# Patient Record
Sex: FEMALE | Race: BLACK OR AFRICAN AMERICAN
[De-identification: names, ages, dates, MRNs, and addresses within clinical notes are randomized per-mention and may not be internally consistent; named-entity substitution may affect disease eponyms.]

---

## 2019-01-20 NOTE — HPEPDOC
Obstetrical History & Physical


General


Date of Admission


2019 at 15:35





History of Present Illness





39 yo  at 38+4 weeks by LMP of [id number] c/w 1st trimester US on  presented to L&D in active labor.  She denies any leakage of fluid or 

bleeding.  She endorses fetal movement.  Pregnancy is complicated by AMA, and 

she had low risk cff DNA testing.


Chief Complaint:  Contractions, term


Information Provided By:  Patient


Age:  38


:  4


Term:  2


Pre-term:  0


Abortions:  1 (Ectopic pregnancy requiring left salpingectomy)


Livin





Prenatal Care


Prenatal Care:  [facility]





Prenatal Dating


Final EDC:  2019


Final EDC for Daily Update:  2019


Final EDC by:  LMP


LMP:  2018


1st Trimester Date:  2018





Antepartum Course


Prenatal Diagnos(e)s





AMA ---> low risk cff DNA genetic screening





Past Medical History


Past Obstetrical History :  


   Past Obstetrical History:  Multigravida


   Type of Delivery:  Spontaneous Vaginal Del. ( X2 at term.  Pelvis proven 

to 8lbs 3oz.)


   Complications:  No


GYN History:  No pertinent history


Past Medical History


Medical History





Denies


Surgical History:  Appendectomy, Other (Left salpingectomy for ectopic 

pregnancy)





Family History


Significant Family History:  No pertinent family hx


Family History


Non contributory





Social History


Marital Status:  


Family situation:  Spouse/partner deployed


Psychosocial History:  No pertinent psych hx


* Smoker:  non-smoker


Alcohol:  Denies


Drugs:  denies





Prenatal Imunizations


Tdap status:  current


Influenza Status:  current





Allergies


Coded Allergies:  


     No Known Allergies (Unverified , 19)





Medications


Scheduled


Multivitamins/Prenatal (Prenatal 27-0.8 mg) 1 Tab Tab, 1 TAB PO DAILY





Physical Examination


Physical Examination





GENERAL: Alert and oriented times three.


ABDOMEN: Gravid and non-tender to touch.


FETUS: Is vertex (VTX) by sterile vaginal examination (SVE)


EXTREMITIES: No edema.





Laboratory Data


24H LABS


Laboratory Tests 2


19 15:50: Serology Scanned Report Hepatitis B Testing


Urine Culture:  No Growth





Pertinent Laboratoy Data


Blood Type:  B+


RBC Antibody Screen:  Negative


HIV:  Negative


Hepatitis B:  Negative


Hepatitis C:  Unknown


Rapid Plasma Reagin:  Nonreactive


Rubella:  Immune


Varicella:  Immune


Chlamydia/Gonorrhea:  Negative


Group B Streptococcus:  Negative


Quad Screen Test:  Negative


Cystic Fibrosis:  Negative


Glucose Tolerance Test:  83





Anatomy Ultrasound


Placenta Location:  Posterior


Normal Anatomy:  Yes (Choroid plexus cyst, hypocoiled umbilical cord, and EIF)





 Steroid Therapy


 Steroid Therapy:  No





Vaginal Examination


Dilation:  8 cm


Effacement:  100%


Station:  -1, 0


Cervical Consistency:  Soft


Cervical Position:  Anterior


Fetal Presentation:  Cephalic presentation


Fetal Position:  Vertex (occiput)





Fetal Assessment


Fetal Heart Rate (FHR):  125


Variability:  Moderate


Accelerations:  Positive


Decelerations:  None





Tocometer


Contractions:  Yes


Frequency:  regular


Duration:  greater than 60 seconds


Strength:  palpated as moderate





Assessment/Plan


Assessment





39 yo  at 38+4 weeks presented to L&D in active labor.  Pregnancy is 

uncomplicated.





Plan





Admit to L&D for expectant management of labor.


Apply IV fluids.


GBS negative.


Patient may have epidural if desired.


Anticipate .








DO BETHANY Genao CHRISTOPHER J. DO         2019 16:04

## 2019-01-20 NOTE — DNPDOC
[facility] Delivery Note


Delivery Note


DATE OF DELIVERY: 2019





PREDELIVERY DIAGNOSIS: 38+4 weeks gestation and active labor 





POST DELIVERY DIAGNOSIS: Delivered.





PROCEDURE: Spontaneous vaginal delivery





OBSTETRICIAN: Dr. Freedman





ANESTHESIA: None.





ESTIMATED BLOOD LOSS: 300 mL.





FINDINGS: 7 pound 0 ounce (3180 grams) male infant, Apgar Score 9/9.





DELIVERY SUMMARY:  I was called to the room as Ms. Bailey felt a strong urge to 

push.  Exam confirmed fetus at +2 station.  AROM performed productive of clear 

fluid.  The bed was broken down and she was prepped for delivery.  With 

excellent pushing effort her infant delivered.  Presentation was DANIELITO with 

restitution to ROT.  The left anterior shoulder delivered with gentle guidance 

followed easily by the remainder of the body.  The infant was dried and 

stimulated on the field and a bulb suction was used.  The infant cried 

vigorously and was placed on the maternal abdomen.  I then clamped the 3 vessel 

umbilical cord and Ms. Bailey cut it.  Third staged was completed spontaneously 

and it was productive of an intact placenta.  Inspection of the placenta 

revealed an approximately 25% area concerning for abruption.  The uterine fundus

was firmed with massage and pitocin was administered IV bolus. Inspection of the

cervix, perineum, labia, and vagina revealed a small, midline first degree 

vaginal floor laceration.  This was repaired with 3-0 vicryl suture in the usual

fashion.  There was excellent approximation of tissue and hemostasis.  The fund

us was palpated again and was firm.  Sponge, instrument, and needle counts were 

correct X2.  Mother stable when I left the room.








DO BETHANY Genao CHRISTOPHER J. DO         2019 17:50

## 2019-01-21 NOTE — IPNPDOC
Text Note


Date of Service


The patient was seen on 19.





NOTE





Ms. Bailey is a 39 yo G4 now P3 who underwent an uncomplicated  in the evening

on 2019.  Today she reports feeling well.  She is ambulating, voiding, and 

tolerating a regular diet.  Her lochia is minimal and she has no pain.  She is 

breastfeeding.





Vitals - VSS, intermittent mild BP elevations, but nothing consistent or 

severely elevated.  Afebrile, non tachycardic


General - AAOX3, sitting up in bed, NAD


Abdomen - Fundus firm at U-2.  No fundal tenderness


Extremities - No edema





No new labs





Ms. Bailey is doing well this AM and is making an appropriate postpartum 

recovery.   is in [place] and she is currently undecided regarding 

additional contraception.  Continue to encourage ambulation and breastfeeding 

today.  Continue routine postpartum care.  Anticipate DC home tomorrow.





Oneyda Sims DO





VS,Fishbone, I+O


VS, Fishbone, I+O


Laboratory Tests


19 15:38








Red Blood Count 4.10, Mean Corpuscular Volume 87.3, Mean Corpuscular Hemoglobin 

28.3, Mean Corpuscular Hemoglobin Concent 32.4, Red Cell Distribution Width 12.4








Vital Signs








  Date Time  Temp Pulse Resp B/P (MAP) Pulse Ox O2 Delivery O2 Flow Rate FiO2


 


19 06:01 98.3 68 16 135/72 (93)    














I&O- Last 24 Hours up to 6 AM 


 


 19





 06:00


 


Intake Total 1500 ml


 


Output Total 1100 ml


 


Balance 400 ml

















ONEYDA SIMS DO         2019 07:20

## 2019-01-22 NOTE — IPNPDOC
Postpartum Progress Note


Date of Service:  2019


Postpartum Day#:  2


Postpartum Progress Note


PPD 2





SUBJECT: Keiry is a 39yo [id number] s/p uncomplicated  after presenting in 

active labor at 38+wk, doing well postpartum day # 2. She has been ambulating, 

voiding spontaneously without issue and tolerating regular diet. Breast feeding 

without issue. Reports lochia is like a normal period. No f/c/n/v/CP/SOB.





OBJECTIVE: 


VITAL SIGNS: Within normal limits, afebrile.


Alert and oriented times three.


Abdomen: Fundus firm at U-2. Soft, NTTP.





ASSESSMENT: Keiry is a 39yo [id number] s/p uncomplicated  after presenting 

in active labor at 38+wk, doing well postpartum day # 2. Vitals within normal 

limits, afebrile, hemodynamically stable with no evidence of infection.





PLAN:


1. Discharge to home today.


2. Tylenol and Motrin for pain.


3. Encourage breast feeding and ambulation.


4. Routine PP visit in 6 weeks in clinic.


5. Discussed return precautions at length.





Dr. Melodie Hoffman MD





VS, I&O, 24H, Fishbone


Vital Signs/I&O





Vital Signs








  Date Time  Temp Pulse Resp B/P (MAP) Pulse Ox O2 Delivery O2 Flow Rate FiO2


 


19 05:47 97.8 62 18 134/70 (91)    

















Melodie Hoffman MD           2019 06:42

## 2021-08-02 NOTE — IPNPDOC
Text Note


Date of Service


The patient was seen on 21.





NOTE











Item Value  Date Time


 


Urine Color YELLOW 21


 


Urine Appearance HAZY 21


 


Urine pH 6.0 UNITS 21


 


Urine Specific Gravity 1.013 21


 


Urine Protein NEGATIVE mg/dL 21


 


Urine Glucose (UA) NEGATIVE mg/dL 21


 


Urine Ketones NEGATIVE mg/dL 21


 


Urine Blood 1+ H 21


 


Urine Nitrite NEGATIVE 21


 


Urine Bilirubin NEGATIVE 21


 


Urine Urobilinogen 0.2 mg/dL 21


 


Urine Leukocyte Esterase NEGATIVE 21


 


Urine WBC (Auto) 0 /HPF 21


 


Urine RBC (Auto) 1 /HPF 21


 


Urine Hyaline Casts (Auto) 0 /LPF 21


 


Urine Bacteria (Auto) NEGATIVE 21


 


Urine Squamous Epithelial Cells 2 /HPF 21


 


Urine Mucus (Auto) SMALL 21 41 yo  AT 36.4 WEEKS BASED ON US AT 8.1 WEEKS  EDC 21 

HISTORY CONTRACTIONS 5 2-5 MINUTES WITH PELVIC PRESSURE. 


 PAST HISTORY 


                    39.4 WEEKS  MALE 7LBS 3 OZ


                   10/2016 38 WEEKS  MALE 8 LBS 3 OZ


                   2018 38 WEEKS MALE  7 LBS 


                   10/2016 ECTOPIC  8 WEEKS LSO 


 RISK FACTORS 


                   AMA 


                   CHTN 


  PLAN AND EXAMINATION 


               APPEARS DISTRESSED CONTRACTIONS 4-5 MINUTES NO SHOW NO BLEEDING  

PELVIC EXAMINATION 2-3 CM VERY POSTERIOR 50% EFFACED  -3 STATION NO BLOOD 

CATEGORY 1 STRIP. REPEAT 3 HOURS LATER STILL DISTRESSED CERVIX NO CHANGE 

CATEGORY 1 STRIP NO DECELERATIONS MODERATE VARIABILITY  BASELINE NORMAL URINE 

NEGATIVE WELL HYDRATED . 


               OPTIONS  GO HOME OR SEDATION TO REST . PATIENT OPTED FOR SEDATION

WILL RECHECK IN 3 HOURS





VS,Fishbone, I+O


VS, Fishbone, I+O





Vital Signs








  Date Time  Temp Pulse Resp B/P (MAP) Pulse Ox O2 Delivery O2 Flow Rate FiO2


 


21 23:32  77 18 143/80 (101)    


 


21 23:29 98.5       

















Kaleb Macias MD              Aug 2, 2021 02:29

## 2021-08-02 NOTE — DSES
DISCHARGE SUMMARY



DATE OF ADMISSION:  2021

DATE OF DISCHARGE:  2021



This lady is a 40-year-old,  5, para 3, admitted for what appeared

distressful contractions 4-5 minutes apart, no show, no bleeding. Pelvic

examination showed 2-3 cm very posterior, 50% effaced, minus 3 station, no

bloody show, category 1 strip, repeated 3 hours later and 6 hours later, no

evidence of change of cervix. Contractions did space out. She was given Stadol

and Phenergan for sleep, did have a good sleep and upon re-examination, there

is basically no change at 0530 hours in the morning. Her vital signs: Her blood

pressure is 143/80. Respirations are 18, pulse 77. Temperature is 98.5. Retake

of her blood pressure 128/76, respirations 18, pulse 68. Temperature is 98.l5.

Examination is unchanged. The patient was given Stadol and Phenergan to sleep,

did a good sleep. Upon waking up, she had typical fetal heart rate tracing

based on her Stadol and Phenergan although there were no decelerations.

Contractions had spaced out considerably. The patient was discharged

undelivered. She has a followup appointment in 48 hours at the office.

Precautions were given. All questions were answered, 20 minute discussion. The

patient was discharged undelivered.







[facility]

## 2021-08-09 NOTE — IPNPDOC
Text Note


Date of Service


The patient was seen on 21.





NOTE


S: 39yo  peterson [id number] @37+4, presents to labor and delivery triage with c/o

pelvic pressure and irregular mild contractions.  Denies bleeding or LOF, states

reassuring FM





O: VSS


RNST, fhr 130, +accel, -decel, occasional contractions noted


Cervical exam 4/80/-3, posterior, moderate








A: AMA


suspected condition not found





P:Pt educated on return precautions, signs of labor, comfort measures, safe use 

of OTC medication for comfort including tylenol and benadryl for sleep with 

expressed understanding of all instructions and reasons to return for care.





VS,Fishbone, I+O


VS, Fishbone, I+O





Vital Signs








  Date Time  Temp Pulse Resp B/P (MAP) Pulse Ox O2 Delivery O2 Flow Rate FiO2


 


21 13:57 98.8 88 20 132/86 (101)    

















ZACH LAMA CNM        Aug 9, 2021 15:06

## 2021-08-12 NOTE — HPEPDOC
Obstetrical History & Physical


General


Date of Admission


Aug 12, 2021 at 08:18





History of Present Illness


presenting for scheduled IOL for ARIE MOJICA @38 wks


Chief Complaint:  Induction of labor


Information Provided By:  Patient


Age:  40


:  5


Term:  3


Pre-term:  0


Abortions:  1


Living:  3





Prenatal Care


Prenatal Care:  [facility]





Prenatal Dating


Final EDC:  Aug 26, 2021


Final EDC for Daily Update:  Aug 26, 2021


Final EDC by:  LMP


LMP:  2020


EGA at Admission:  38





Antepartum Course


Prenatal Diagnos(e)s


ARIE MOJICA


Height (inches):  67


Pre-Pregnancy weight (lbs.):  206


Admission Weight (lbs.):  245


Change in Weight (lbs.):  39





Past Medical History


Past Obstetrical History #1:  


   Past Obstetrical History:  Multigravida (2015, 39+4, 7#3 male)


   Type of Delivery:  Spontaneous Vaginal Del.


   Sex of Infant:  Male


Past Obstetrical History #2:  


   Past Obstetrical History:  Multigravida (2016, 38, 8#3 male)


   Type of Delivery:  Spontaneous Vaginal Del.


   Sex of Infant:  Male


Past Obstetrical History #3:  


   Past Obstetrical History:  Multigravida (2018, 38, 7# male)


   Type of Delivery:  Spontaneous Vaginal Del.


   Sex of Infant:  Male


   Complications:  No


GYN History:  Ectopic pregnancy (8wk left salpingectomy)


Past Medical History


Medical History


CHTN, obesity


Surgical History:  Appendectomy, Other (left salpingectomy for ectopic pregnan

cy)





Family History


Significant Family History:  No pertinent family hx





Social History


Marital Status:  


Family situation:  Spouse/partner home


Psychosocial History:  No pertinent psych hx


* Smoker:  non-smoker


Alcohol:  Denies


Drugs:  denies





Abuse Violence Screening


Have you been hit/kicked/slapp:  No


Have you been sexually assault:  No





Prenatal Imunizations


Tdap status:  current


Influenza Status:  current





Allergies


Coded Allergies:  


     No Known Allergies (Unverified , 19)





Medications


Scheduled


Prenatal No.137/Iron/Folic Acd (Prenatal Vitamin Tablet) 1 Tab Tab, 1 TAB PO 

DAILY





Physical Examination


Physical Examination


GENERAL: Alert and oriented times three.


BREAST: .


ABDOMEN: Gravid and non-tender to touch.


FETUS: Is vertex (VTX) by sterile vaginal examination (SVE), fetus is vertex 

(VTX) by Leopold.


HEART RATE: Regular rate and rhythm.


LUNGS: Clear to auscultation (CTA).


EXTREMITIES: No edema. No clonus. Deep tendon reflexes (DTRs) + 2.





Vital Signs/I&O





Vital Signs








  Date Time  Temp Pulse Resp B/P (MAP) Pulse Ox O2 Delivery O2 Flow Rate FiO2


 


21 10:17  78 16 124/75 (91)    


 


21 08:34     99 Room Air  


 


21 08:33 98.2       











Pertinent Laboratoy Data


Blood Type:  B+


RBC Antibody Screen:  Negative


HIV:  Negative


Hepatitis B:  Negative


Rapid Plasma Reagin:  Nonreactive


Rubella:  Immune


Varicella:  Immune


Chlamydia/Gonorrhea:  Negative


Group B Streptococcus:  Negative


Cystic Fibrosis:  Negative





Anatomy Ultrasound


Ultrasound Date:  2021


Placenta Location:  Anterior


Normal Anatomy:  Yes


Placenta Previa:  No





Vaginal Examination


Dilation:  4 cm


Effacement:  80%


Station:  -2


Cervical Consistency:  Soft


Cervical Position:  Posterior


Fetal Presentation:  Cephalic presentation





Fetal Assessment


Fetal Heart Rate (FHR):  120


Variability:  Moderate


Accelerations:  Positive


Decelerations:  None





Tocometer


Contractions:  Yes


Frequency:  greater than 9 min/apart


Duration:  greater than 60 seconds


Strength:  palpated as mild, resting tone palp/soft


Multi-drug resistant Organism:  No history of MDRO





Assessment/Plan


Assessment


Keiry is a 40-year-old  (G)5 para (P)3-0-1-3 at 38+0 weeks by 8+1-week 

ultrasound. Presents to Labor and Delivery (L&D) for scheduled IOL.  Denies 

bleeding, LOF, states irregular cramping has continued and she has frequent 

fetal movement.





Plan


Admit and orient.


 and consent.


Diet: clear.


Group B Streptococcus (GBS) negative.


Labs and intravenous (IV) per unit protocol.


Counseled on Pitocin and induction of labor (IOL).


Lactated Ringers (LR): at 125 mL/hr, bolus 1000mL prior to epidural anesthesia.


Anticipate [normal spontaneous delivery ()].


C-S as appropriate.











ZACH LAMA CNM       Aug 12, 2021 11:18

## 2021-08-12 NOTE — DNPDOC
[facility] Delivery Note


Delivery Note


DATE OF DELIVERY: 2021





PREDELIVERY DIAGNOSIS: 38-0/7 weeks' gestation and labor. 





POST DELIVERY DIAGNOSIS: Delivered.





PROCEDURE: Spontaneous vaginal delivery.





OBSTETRICIAN: Suly Lama CNM





ANESTHESIA: epidural.





ESTIMATED BLOOD LOSS: 200 mL.





FINDINGS: 8 pound 15 ounce male infant, Apgar Score 9/9, bandalero cord times 1.





DELIVERY SUMMARY: Patient is a 40-year-old  5 now para 4-0-1-4 who was 

admitted to labor and delivery for IOL on 2021. After becoming comfortable 

from epidural, patient was examined at 6/80/-3, AROM for clear fluid.  Shortly 

after exam, Keiry called out with c/o pressure and urge to push, at which time 

she was found to be C/C/+2.  Brisk fetal decent was made with maternal pushing 

efforts to  in OA presentation with restitution to AFSHIN.  A cord was noted 

after delivery of the fetal head and found to be bandalero.  The right anterior 

shoulder delivered easily followed by the posterior shoulder and corpus.  The 

male infant was placed immediately skin to skin on the maternal abdomen where he

was dried and stimulated to cry.  Pitocin infusion was initiated per protocol.  

The cord was clamped x2 after pulsation ceased and cut by the patient.  The pl

acenta delivered spontaneously intact in Perez presentation with moderate 

bleeding.  The cervix and vagina were swept for a small amount of clots and the 

fundus massaged until firm.  Examination of the perineum revealed a small 

hemostatic perineal laceration which did not require repair.  Mother and baby 

entered the recovery phase in stable condition.











SULY LAMA CNM       Aug 12, 2021 16:32

## 2021-08-13 NOTE — IPN
PROGRESS NOTE



DATE:  2021



SUBJECTIVE: This patient is a 40-year-old  5, now para 4 admitted for

induction of labor at 38 weeks gestation. She has history of chronic

hypertension and AMA.



She had spontaneous vaginal delivery male infant 8 pounds 15 ounces, Apgars of

9 and 9 at 1 and 5 minutes respectively. The placenta was complete. Perineum

was intact.



OBJECTIVE: On her first postpartum day we discussed phlebitis, cystitis,

mastitis, endometritis, cellulitis, diet, exercise, pain management, perineal

and breast care. The rest of the examination is unremarkable. Normocephalic,

atraumatic. Neck with full range of motion. Pupils equal and reactive to light.

Distal pulses symmetric. No evidence of DVT, PE, or superficial phlebitis.

Chest clear bilaterally to the bases. No wheezing. No rhonchi. No CVA

tenderness. Abdomen soft. Four quadrant bowels sounds are noted. Uterus two

below, lochia is moderate. No rashes, lesions, pruritus. No arthralgia or

myalgias. No complaint of joint pain. No complaint of cough, wheeze, shortness

of breath, or dyspnea on exertion. No nausea, vomiting, diarrhea, or

constipation. No urgency or frequency. 



Her vital signs this morning are blood pressure 139/89, respirations 18, pulse

78, temperature 97.9.



Her admitting hemoglobin was 11.9, hematocrit 37.1, platelets 190.



ASSESSMENT: In summary, we have a term gestation, delivered a livebirth male

infant. We are awaiting clearance for circumcision by the pediatrician.



PLAN: The patient's expectations are to go home tomorrow. Medications to be

picked up at [place]. 20 minute discussion. All questions were answered.





cc: [facility]

## 2021-08-14 NOTE — IPNPDOC
Postpartum Progress Note


Date of Service:  Aug 14, 2021


Postpartum Day#:  2


Postpartum Progress Note





DELIVERY SUMMARY: Patient is a 40-year-old  5 now para 4-0-1-4 who was 

admitted to labor and delivery for IOL for CHTN on 2021 and had a vaginal 

delivery, 8 pound 15 ounce male infant, Apgar Score 9/9, bandalero cord times 1.

She has been ambulating, voiding spontaneously without issue and tolerating 

regular diet. Breast feeding without issue. Reports lochia is less than a normal

period]. Patient is ambulating well. [Reports some cramping with breastfeeding. 

Denies any pain. Voiding and stooling without difficulty].





OBJECTIVE: 


VITAL SIGNS: Within normal limits, afebrile.


Alert and oriented times three.


no increased wob


Heart rate: non-tachy


Abdomen: Fundus firm at U-2. Soft, NTTP.


[Minimal] lochia per pt





ASSESSMENT: Patient is a 40-year-old  5 now para 4-0-1-4 who was admitted

 to labor and delivery for IOL for CHTN on 2021 and had a vaginal delivery,

 8 pound 15 ounce male infant, Apgar Score 9/9, bandalero cord times 1. Vitals 

within normal limits, afebrile, hemodynamically stable with no evidence of 

infection.





PLAN:


1. Discharge to home today.


2. Tylenol and Motrin for pain.


3. Encourage breast feeding and ambulation.


4. Desires mirena IUD for contraception.


5. Routine PP visit in 72h/7d (blood pressure checks) and 6 weeks in clinic.


6. Discussed return precautions at length to include pelvic rest and signs and 

symptoms of pre-eclampsia.





VS, I&O, 24H, Fishbone


Vital Signs/I&O





Vital Signs








  Date Time  Temp Pulse Resp B/P (MAP) Pulse Ox O2 Delivery O2 Flow Rate FiO2


 


21 17:47 98.0 68 18 138/72 (94) 99   


 


21 18:32      Room Air  

















HUMES,JAMIE C. DO              Aug 14, 2021 05:25

## 2021-08-14 NOTE — OBDS
[facility] Obstetrical Discharge Sum.


A/P, Post Partum Course


List any complications





Patient is a 40-year-old  5 now para 4-0-1-4 who was admitted to labor 

and delivery for induction of labor for chronic hypertension on 2021 and 

had a vaginal delivery, 8 pound 15 ounce male infant, Apgar Score 9/9, bandalero

cord times 1. She has been ambulating, voiding spontaneously without issue and 

tolerating regular diet. Breast feeding without issue. Reports lochia is less 

than a normal period. Patient is ambulating well. Reports some cramping with 

breastfeeding. Denies any pain. Voiding and stooling without difficulty. Vitals 

within normal limits, afebrile, hemodynamically stable with no evidence of 

infection.





PLAN:


1. Discharge to home today.


2. Tylenol and Motrin for pain.


3. Encourage breast feeding and ambulation.


4. Desires mirena IUD for contraception.


5. Routine PP visit in 72h/7d (blood pressure checks) and 6 weeks in clinic.


6. Discussed return precautions at length to include pelvic rest and signs and 

symptoms of pre-eclampsia.











HUMES,JAMIE C. DO              Aug 14, 2021 05:48

## 2022-06-07 ENCOUNTER — HOSPITAL ENCOUNTER (OUTPATIENT)
Dept: HOSPITAL 53 - M SDC | Age: 42
Discharge: HOME | End: 2022-06-07
Attending: OBSTETRICS & GYNECOLOGY
Payer: COMMERCIAL

## 2022-06-07 VITALS — SYSTOLIC BLOOD PRESSURE: 150 MMHG | DIASTOLIC BLOOD PRESSURE: 78 MMHG

## 2022-06-07 VITALS — BODY MASS INDEX: 35.63 KG/M2 | HEIGHT: 67 IN | WEIGHT: 227 LBS

## 2022-06-07 DIAGNOSIS — Z30.2: Primary | ICD-10-CM

## 2022-06-07 DIAGNOSIS — Z79.899: ICD-10-CM

## 2022-06-07 DIAGNOSIS — I10: ICD-10-CM

## 2022-06-07 LAB
B-HCG SERPL-ACNC: < 1 MIU/ML
BUN SERPL-MCNC: 10 MG/DL (ref 7–18)
CALCIUM SERPL-MCNC: 9.2 MG/DL (ref 8.5–10.1)
CHLORIDE SERPL-SCNC: 110 MEQ/L (ref 98–107)
CO2 SERPL-SCNC: 31 MEQ/L (ref 21–32)
CREAT SERPL-MCNC: 0.86 MG/DL (ref 0.55–1.3)
GFR SERPL CREATININE-BSD FRML MDRD: > 60 ML/MIN/{1.73_M2} (ref 58–?)
GLUCOSE SERPL-MCNC: 94 MG/DL (ref 70–100)
HCT VFR BLD AUTO: 39.3 % (ref 36–47)
HGB BLD-MCNC: 12.4 G/DL (ref 12–15.5)
MCH RBC QN AUTO: 28.1 PG (ref 27–33)
MCHC RBC AUTO-ENTMCNC: 31.6 G/DL (ref 32–36.5)
MCV RBC AUTO: 89.1 FL (ref 80–96)
PLATELET # BLD AUTO: 246 10^3/UL (ref 150–450)
POTASSIUM SERPL-SCNC: 4.3 MEQ/L (ref 3.5–5.1)
RBC # BLD AUTO: 4.41 10^6/UL (ref 4–5.4)
SODIUM SERPL-SCNC: 142 MEQ/L (ref 136–145)
WBC # BLD AUTO: 4.2 10^3/UL (ref 4–10)

## 2022-06-07 PROCEDURE — 58661 LAPAROSCOPY REMOVE ADNEXA: CPT

## 2022-06-07 PROCEDURE — 88302 TISSUE EXAM BY PATHOLOGIST: CPT

## 2022-06-07 PROCEDURE — 80048 BASIC METABOLIC PNL TOTAL CA: CPT

## 2022-06-07 PROCEDURE — 85027 COMPLETE CBC AUTOMATED: CPT

## 2022-06-07 PROCEDURE — 36415 COLL VENOUS BLD VENIPUNCTURE: CPT

## 2022-06-07 PROCEDURE — 84702 CHORIONIC GONADOTROPIN TEST: CPT
